# Patient Record
Sex: MALE | Race: WHITE | NOT HISPANIC OR LATINO | Employment: FULL TIME | URBAN - METROPOLITAN AREA
[De-identification: names, ages, dates, MRNs, and addresses within clinical notes are randomized per-mention and may not be internally consistent; named-entity substitution may affect disease eponyms.]

---

## 2024-11-22 ENCOUNTER — OFFICE VISIT (OUTPATIENT)
Dept: GASTROENTEROLOGY | Facility: CLINIC | Age: 24
End: 2024-11-22
Payer: COMMERCIAL

## 2024-11-22 ENCOUNTER — TELEPHONE (OUTPATIENT)
Dept: GASTROENTEROLOGY | Facility: CLINIC | Age: 24
End: 2024-11-22

## 2024-11-22 VITALS
BODY MASS INDEX: 34.72 KG/M2 | DIASTOLIC BLOOD PRESSURE: 80 MMHG | HEIGHT: 71 IN | SYSTOLIC BLOOD PRESSURE: 122 MMHG | WEIGHT: 248 LBS

## 2024-11-22 DIAGNOSIS — R10.13 EPIGASTRIC ABDOMINAL PAIN: Primary | ICD-10-CM

## 2024-11-22 DIAGNOSIS — R11.2 NAUSEA AND VOMITING, UNSPECIFIED VOMITING TYPE: ICD-10-CM

## 2024-11-22 DIAGNOSIS — R19.7 DIARRHEA, UNSPECIFIED TYPE: ICD-10-CM

## 2024-11-22 PROCEDURE — 99204 OFFICE O/P NEW MOD 45 MIN: CPT | Performed by: INTERNAL MEDICINE

## 2024-11-22 RX ORDER — OMEPRAZOLE 40 MG/1
40 CAPSULE, DELAYED RELEASE ORAL 2 TIMES DAILY
COMMUNITY

## 2024-11-22 RX ORDER — SODIUM CHLORIDE, SODIUM LACTATE, POTASSIUM CHLORIDE, CALCIUM CHLORIDE 600; 310; 30; 20 MG/100ML; MG/100ML; MG/100ML; MG/100ML
125 INJECTION, SOLUTION INTRAVENOUS CONTINUOUS
OUTPATIENT
Start: 2024-11-22

## 2024-11-22 RX ORDER — SODIUM, POTASSIUM,MAG SULFATES 17.5-3.13G
177 SOLUTION, RECONSTITUTED, ORAL ORAL ONCE
Qty: 177 ML | Refills: 0 | Status: SHIPPED | OUTPATIENT
Start: 2024-11-22 | End: 2024-11-22

## 2024-11-22 NOTE — H&P (VIEW-ONLY)
[facility] - Outpatient Consultation  Cholo Almodovar 24 y.o. male MRN: [id number]  Encounter: [id number]    ASSESSMENT AND PLAN:      Assessment & Plan  1. Abdominal pain.  The patient's symptoms could be indicative of reflux, an ulcer, or gallbladder issues, despite a negative ultrasound. The change in bowel movements could suggest inflammatory bowel disease, Crohn's disease, polyp/growth, or colitis. A HIDA scan was recommended, along with blood and stool tests to further investigate the cause of the bowel changes. An endoscopy and colonoscopy were also suggested. He was advised to continue with the omeprazole prescribed by his primary care physician. A prescription for Suprep was provided, along with instructions for its use.    Follow-up  Patient is scheduled for a follow-up visit on 12/05/2024.  1. Epigastric abdominal pain  -     NM hepatobiliary w rx; Future; Expected date: 11/22/2024  -     Calprotectin,Fecal; Future  -     Giardia antigen; Future  -     Stool Enteric Bacterial Panel by PCR; Future  -     TSH, 3rd generation; Future  -     C-reactive protein; Future  -     Celiac Disease Comprehensive Panel; Future  -     Calprotectin,Fecal  -     Giardia antigen  -     Stool Enteric Bacterial Panel by PCR  -     TSH, 3rd generation  -     C-reactive protein  -     Celiac Disease Comprehensive Panel  -     EGD; Future; Expected date: 11/22/2024  2. Nausea and vomiting, unspecified vomiting type  -     NM hepatobiliary w rx; Future; Expected date: 11/22/2024  -     Calprotectin,Fecal; Future  -     Giardia antigen; Future  -     Stool Enteric Bacterial Panel by PCR; Future  -     TSH, 3rd generation; Future  -     C-reactive protein; Future  -     Celiac Disease Comprehensive Panel; Future  -     Calprotectin,Fecal  -     Giardia antigen  -     Stool Enteric Bacterial Panel by PCR  -     TSH, 3rd generation  -     C-reactive protein  -     Celiac Disease Comprehensive  Panel  -     EGD; Future; Expected date: 11/22/2024  3. Diarrhea, unspecified type  -     NM hepatobiliary w rx; Future; Expected date: 11/22/2024  -     Calprotectin,Fecal; Future  -     Giardia antigen; Future  -     Stool Enteric Bacterial Panel by PCR; Future  -     TSH, 3rd generation; Future  -     C-reactive protein; Future  -     Celiac Disease Comprehensive Panel; Future  -     Calprotectin,Fecal  -     Giardia antigen  -     Stool Enteric Bacterial Panel by PCR  -     TSH, 3rd generation  -     C-reactive protein  -     Celiac Disease Comprehensive Panel  -     Na Sulfate-K Sulfate-Mg Sulf (Suprep Bowel Prep Kit) 17.5-3.13-1.6 GM/177ML SOLN; Take 177 mL by mouth once for 1 dose  -     Colonoscopy; Future; Expected date: 11/22/2024     Other orders    lactated ringers infusion      ---    Chief Complaint   Patient presents with    ER follow up-abd./Chest pain       HPI:   Cholo Almodovar is a 24 y.o. male presenting to [facility] care.   History of Present Illness  The patient is a 24-year-old male presenting for a consultation from Dr. Mills regarding abdominal pain.    He reports frequent bowel movements, often 2 to 3 times after meals, totaling 5 times a day. He experiences discomfort post meals and has had 10 to 12 episodes of severe upper abdominal pain over the past year, which sometimes radiates to his chest. These episodes have been so severe that he has had to lie on the floor. During his last episode, he began vomiting and was taken to the ER by his fiancé. He has tried various medications, including loperamide and over-the-counter remedies like Tums, but found no relief. His pain is sharp and stabbing, lasting for an hour before subsiding. He has also tried Gas-X and was prescribed omeprazole, which seems to help. He has been following a BRAT diet and avoiding greasy and oily foods. He has loose bowel movements 3 to 4 times a day, but denies any presence of blood in his stool or vomit. He has not  "undergone an endoscopy or colonoscopy before. A CT scan was performed, but he was informed that a HIDA scan would be more beneficial.    FAMILY HISTORY  His grandfather had cancer in his jaw and grandmother had cancer in her abdomen.    Historical Information   Past Medical History:   Diagnosis Date    Fatty liver 11/9/2024    GERD (gastroesophageal reflux disease)     Irritable bowel syndrome     Lactose intolerance      History reviewed. No pertinent surgical history.  Social History     Substance and Sexual Activity   Alcohol Use Not Currently     Social History     Substance and Sexual Activity   Drug Use Never     Social History     Tobacco Use   Smoking Status Never   Smokeless Tobacco Never     Family History   Problem Relation Age of Onset    Asthma Father     Cancer Paternal Grandfather     Cancer Paternal Grandmother        Meds/Allergies     Current Outpatient Medications:     Na Sulfate-K Sulfate-Mg Sulf (Suprep Bowel Prep Kit) 17.5-3.13-1.6 GM/177ML SOLN    omeprazole (PriLOSEC) 40 MG capsule  No Known Allergies    PHYSICAL EXAM:    Blood pressure 122/80, height 5' 11\" (1.803 m), weight 112 kg (248 lb). Body mass index is 34.59 kg/m².  Physical Exam      General Appearance: No apparent distress, cooperative, alert.  Eyes: Anicteric.  Gastrointestinal: Soft, non-tender, non-distended; normal bowel sounds; no masses, no organomegaly.    Rectal: Deferred.  Musculoskeletal: No edema.  Skin: No jaundice.     OTHER LAB RESULTS:   No results found for: \"WBC\", \"HGB\", \"MCV\", \"PLT\", \"INR\"  No results found for: \"NA\", \"K\", \"CL\", \"CO2\", \"ANIONGAP\", \"BUN\", \"CREATININE\", \"GLUCOSE\", \"GLUF\", \"CALCIUM\", \"CORRECTEDCA\", \"AST\", \"ALT\", \"ALKPHOS\", \"ALB\", \"TBILI\", \"EGFR\"  No results found for: \"IRON\", \"TIBC\", \"FERRITIN\"  No results found for: \"LIPASE\"    OTHER RADIOLOGY RESULTS:   No results found.    I have spent 41 minutes today on the date of visit which was spent on one or more of the following: obtaining and reviewing " separately obtained history, performing a medically appropriate examination and evaluation, counseling and educating the patient, ordering medications, tests, and procedures, documenting clinical information in the electronic or other health record, and care coordination.

## 2024-11-22 NOTE — TELEPHONE ENCOUNTER
Scheduled date of combo (as of today): 12/16/24  Physician performing combo: WILBERT  Location of combo: [facility]   Bowel prep reviewed with patient: Clenpiq  Instructions reviewed with patient by: TIFFANY  Clearances: N

## 2024-11-22 NOTE — PROGRESS NOTES
[facility] - Outpatient Consultation  Cholo Almodovar 24 y.o. male MRN: [id number]  Encounter: [id number]    ASSESSMENT AND PLAN:      Assessment & Plan  1. Abdominal pain.  The patient's symptoms could be indicative of reflux, an ulcer, or gallbladder issues, despite a negative ultrasound. The change in bowel movements could suggest inflammatory bowel disease, Crohn's disease, polyp/growth, or colitis. A HIDA scan was recommended, along with blood and stool tests to further investigate the cause of the bowel changes. An endoscopy and colonoscopy were also suggested. He was advised to continue with the omeprazole prescribed by his primary care physician. A prescription for Suprep was provided, along with instructions for its use.    Follow-up  Patient is scheduled for a follow-up visit on 12/05/2024.  1. Epigastric abdominal pain  -     NM hepatobiliary w rx; Future; Expected date: 11/22/2024  -     Calprotectin,Fecal; Future  -     Giardia antigen; Future  -     Stool Enteric Bacterial Panel by PCR; Future  -     TSH, 3rd generation; Future  -     C-reactive protein; Future  -     Celiac Disease Comprehensive Panel; Future  -     Calprotectin,Fecal  -     Giardia antigen  -     Stool Enteric Bacterial Panel by PCR  -     TSH, 3rd generation  -     C-reactive protein  -     Celiac Disease Comprehensive Panel  -     EGD; Future; Expected date: 11/22/2024  2. Nausea and vomiting, unspecified vomiting type  -     NM hepatobiliary w rx; Future; Expected date: 11/22/2024  -     Calprotectin,Fecal; Future  -     Giardia antigen; Future  -     Stool Enteric Bacterial Panel by PCR; Future  -     TSH, 3rd generation; Future  -     C-reactive protein; Future  -     Celiac Disease Comprehensive Panel; Future  -     Calprotectin,Fecal  -     Giardia antigen  -     Stool Enteric Bacterial Panel by PCR  -     TSH, 3rd generation  -     C-reactive protein  -     Celiac Disease Comprehensive  Panel  -     EGD; Future; Expected date: 11/22/2024  3. Diarrhea, unspecified type  -     NM hepatobiliary w rx; Future; Expected date: 11/22/2024  -     Calprotectin,Fecal; Future  -     Giardia antigen; Future  -     Stool Enteric Bacterial Panel by PCR; Future  -     TSH, 3rd generation; Future  -     C-reactive protein; Future  -     Celiac Disease Comprehensive Panel; Future  -     Calprotectin,Fecal  -     Giardia antigen  -     Stool Enteric Bacterial Panel by PCR  -     TSH, 3rd generation  -     C-reactive protein  -     Celiac Disease Comprehensive Panel  -     Na Sulfate-K Sulfate-Mg Sulf (Suprep Bowel Prep Kit) 17.5-3.13-1.6 GM/177ML SOLN; Take 177 mL by mouth once for 1 dose  -     Colonoscopy; Future; Expected date: 11/22/2024     Other orders    lactated ringers infusion      ---    Chief Complaint   Patient presents with    ER follow up-abd./Chest pain       HPI:   Cholo Almodovar is a 24 y.o. male presenting to [facility] care.   History of Present Illness  The patient is a 24-year-old male presenting for a consultation from Dr. Mills regarding abdominal pain.    He reports frequent bowel movements, often 2 to 3 times after meals, totaling 5 times a day. He experiences discomfort post meals and has had 10 to 12 episodes of severe upper abdominal pain over the past year, which sometimes radiates to his chest. These episodes have been so severe that he has had to lie on the floor. During his last episode, he began vomiting and was taken to the ER by his fiancé. He has tried various medications, including loperamide and over-the-counter remedies like Tums, but found no relief. His pain is sharp and stabbing, lasting for an hour before subsiding. He has also tried Gas-X and was prescribed omeprazole, which seems to help. He has been following a BRAT diet and avoiding greasy and oily foods. He has loose bowel movements 3 to 4 times a day, but denies any presence of blood in his stool or vomit. He has not  "undergone an endoscopy or colonoscopy before. A CT scan was performed, but he was informed that a HIDA scan would be more beneficial.    FAMILY HISTORY  His grandfather had cancer in his jaw and grandmother had cancer in her abdomen.    Historical Information   Past Medical History:   Diagnosis Date    Fatty liver 11/9/2024    GERD (gastroesophageal reflux disease)     Irritable bowel syndrome     Lactose intolerance      History reviewed. No pertinent surgical history.  Social History     Substance and Sexual Activity   Alcohol Use Not Currently     Social History     Substance and Sexual Activity   Drug Use Never     Social History     Tobacco Use   Smoking Status Never   Smokeless Tobacco Never     Family History   Problem Relation Age of Onset    Asthma Father     Cancer Paternal Grandfather     Cancer Paternal Grandmother        Meds/Allergies     Current Outpatient Medications:     Na Sulfate-K Sulfate-Mg Sulf (Suprep Bowel Prep Kit) 17.5-3.13-1.6 GM/177ML SOLN    omeprazole (PriLOSEC) 40 MG capsule  No Known Allergies    PHYSICAL EXAM:    Blood pressure 122/80, height 5' 11\" (1.803 m), weight 112 kg (248 lb). Body mass index is 34.59 kg/m².  Physical Exam      General Appearance: No apparent distress, cooperative, alert.  Eyes: Anicteric.  Gastrointestinal: Soft, non-tender, non-distended; normal bowel sounds; no masses, no organomegaly.    Rectal: Deferred.  Musculoskeletal: No edema.  Skin: No jaundice.     OTHER LAB RESULTS:   No results found for: \"WBC\", \"HGB\", \"MCV\", \"PLT\", \"INR\"  No results found for: \"NA\", \"K\", \"CL\", \"CO2\", \"ANIONGAP\", \"BUN\", \"CREATININE\", \"GLUCOSE\", \"GLUF\", \"CALCIUM\", \"CORRECTEDCA\", \"AST\", \"ALT\", \"ALKPHOS\", \"ALB\", \"TBILI\", \"EGFR\"  No results found for: \"IRON\", \"TIBC\", \"FERRITIN\"  No results found for: \"LIPASE\"    OTHER RADIOLOGY RESULTS:   No results found.    I have spent 41 minutes today on the date of visit which was spent on one or more of the following: obtaining and reviewing " separately obtained history, performing a medically appropriate examination and evaluation, counseling and educating the patient, ordering medications, tests, and procedures, documenting clinical information in the electronic or other health record, and care coordination.

## 2024-12-02 ENCOUNTER — ANESTHESIA (OUTPATIENT)
Dept: ANESTHESIOLOGY | Facility: AMBULATORY SURGERY CENTER | Age: 24
End: 2024-12-02

## 2024-12-02 ENCOUNTER — ANESTHESIA EVENT (OUTPATIENT)
Dept: ANESTHESIOLOGY | Facility: AMBULATORY SURGERY CENTER | Age: 24
End: 2024-12-02

## 2024-12-03 ENCOUNTER — RESULTS FOLLOW-UP (OUTPATIENT)
Dept: GASTROENTEROLOGY | Facility: CLINIC | Age: 24
End: 2024-12-03

## 2024-12-03 ENCOUNTER — HOSPITAL ENCOUNTER (OUTPATIENT)
Dept: RADIOLOGY | Facility: HOSPITAL | Age: 24
Discharge: HOME | End: 2024-12-03
Attending: INTERNAL MEDICINE
Payer: COMMERCIAL

## 2024-12-03 DIAGNOSIS — R10.13 EPIGASTRIC ABDOMINAL PAIN: ICD-10-CM

## 2024-12-03 DIAGNOSIS — R11.2 NAUSEA AND VOMITING, UNSPECIFIED VOMITING TYPE: ICD-10-CM

## 2024-12-03 DIAGNOSIS — R19.7 DIARRHEA, UNSPECIFIED TYPE: ICD-10-CM

## 2024-12-03 LAB
CRP SERPL-MCNC: 3.2 MG/L
TSH SERPL-ACNC: 1.02 MIU/L (ref 0.4–4.5)

## 2024-12-03 PROCEDURE — A9537 TC99M MEBROFENIN: HCPCS

## 2024-12-03 PROCEDURE — 78227 HEPATOBIL SYST IMAGE W/DRUG: CPT

## 2024-12-03 RX ORDER — WATER 10 ML/10ML
INJECTION INTRAMUSCULAR; INTRAVENOUS; SUBCUTANEOUS
Status: COMPLETED
Start: 2024-12-03 | End: 2024-12-03

## 2024-12-03 RX ORDER — SINCALIDE 5 UG/5ML
INJECTION, POWDER, LYOPHILIZED, FOR SOLUTION INTRAVENOUS
Status: COMPLETED
Start: 2024-12-03 | End: 2024-12-03

## 2024-12-03 RX ORDER — SINCALIDE 5 UG/5ML
0.02 INJECTION, POWDER, LYOPHILIZED, FOR SOLUTION INTRAVENOUS
Status: DISCONTINUED | OUTPATIENT
Start: 2024-12-03 | End: 2024-12-04 | Stop reason: HOSPADM

## 2024-12-03 RX ADMIN — SINCALIDE 2.2 MCG: 5 INJECTION, POWDER, LYOPHILIZED, FOR SOLUTION INTRAVENOUS at 09:20

## 2024-12-03 RX ADMIN — WATER 5 ML: 1 INJECTION INTRAMUSCULAR; INTRAVENOUS; SUBCUTANEOUS at 09:20

## 2024-12-08 LAB
CALPROTECTIN STL-MCNT: 65 MCG/G
IGA SERPL-MCNC: 172 MG/DL (ref 47–310)
TTG IGA SER-ACNC: <1 U/ML

## 2024-12-16 ENCOUNTER — HOSPITAL ENCOUNTER (OUTPATIENT)
Dept: GASTROENTEROLOGY | Facility: AMBULATORY SURGERY CENTER | Age: 24
Discharge: HOME | End: 2024-12-16
Attending: INTERNAL MEDICINE
Payer: COMMERCIAL

## 2024-12-16 ENCOUNTER — ANESTHESIA EVENT (OUTPATIENT)
Dept: GASTROENTEROLOGY | Facility: AMBULATORY SURGERY CENTER | Age: 24
End: 2024-12-16

## 2024-12-16 ENCOUNTER — ANESTHESIA (OUTPATIENT)
Dept: GASTROENTEROLOGY | Facility: AMBULATORY SURGERY CENTER | Age: 24
End: 2024-12-16

## 2024-12-16 VITALS
SYSTOLIC BLOOD PRESSURE: 100 MMHG | BODY MASS INDEX: 34.02 KG/M2 | HEIGHT: 71 IN | OXYGEN SATURATION: 97 % | WEIGHT: 243 LBS | DIASTOLIC BLOOD PRESSURE: 59 MMHG | TEMPERATURE: 98 F | HEART RATE: 65 BPM | RESPIRATION RATE: 13 BRPM

## 2024-12-16 DIAGNOSIS — R11.2 NAUSEA AND VOMITING, UNSPECIFIED VOMITING TYPE: ICD-10-CM

## 2024-12-16 DIAGNOSIS — R10.13 EPIGASTRIC ABDOMINAL PAIN: ICD-10-CM

## 2024-12-16 DIAGNOSIS — R19.7 DIARRHEA, UNSPECIFIED TYPE: ICD-10-CM

## 2024-12-16 PROCEDURE — 88305 TISSUE EXAM BY PATHOLOGIST: CPT | Performed by: PATHOLOGY

## 2024-12-16 PROCEDURE — 45380 COLONOSCOPY AND BIOPSY: CPT | Performed by: INTERNAL MEDICINE

## 2024-12-16 PROCEDURE — 43239 EGD BIOPSY SINGLE/MULTIPLE: CPT | Performed by: INTERNAL MEDICINE

## 2024-12-16 RX ORDER — LIDOCAINE HYDROCHLORIDE 20 MG/ML
INJECTION, SOLUTION EPIDURAL; INFILTRATION; INTRACAUDAL; PERINEURAL AS NEEDED
Status: DISCONTINUED | OUTPATIENT
Start: 2024-12-16 | End: 2024-12-16

## 2024-12-16 RX ORDER — SODIUM CHLORIDE, SODIUM LACTATE, POTASSIUM CHLORIDE, CALCIUM CHLORIDE 600; 310; 30; 20 MG/100ML; MG/100ML; MG/100ML; MG/100ML
50 INJECTION, SOLUTION INTRAVENOUS CONTINUOUS
Status: DISCONTINUED | OUTPATIENT
Start: 2024-12-16 | End: 2024-12-20 | Stop reason: HOSPADM

## 2024-12-16 RX ORDER — PROPOFOL 10 MG/ML
INJECTION, EMULSION INTRAVENOUS AS NEEDED
Status: DISCONTINUED | OUTPATIENT
Start: 2024-12-16 | End: 2024-12-16

## 2024-12-16 RX ORDER — SODIUM CHLORIDE, SODIUM LACTATE, POTASSIUM CHLORIDE, CALCIUM CHLORIDE 600; 310; 30; 20 MG/100ML; MG/100ML; MG/100ML; MG/100ML
125 INJECTION, SOLUTION INTRAVENOUS CONTINUOUS
Status: DISCONTINUED | OUTPATIENT
Start: 2024-12-16 | End: 2024-12-20 | Stop reason: HOSPADM

## 2024-12-16 RX ORDER — SODIUM CHLORIDE, SODIUM LACTATE, POTASSIUM CHLORIDE, CALCIUM CHLORIDE 600; 310; 30; 20 MG/100ML; MG/100ML; MG/100ML; MG/100ML
INJECTION, SOLUTION INTRAVENOUS CONTINUOUS PRN
Status: DISCONTINUED | OUTPATIENT
Start: 2024-12-16 | End: 2024-12-16

## 2024-12-16 RX ORDER — OMEPRAZOLE 40 MG/1
40 CAPSULE, DELAYED RELEASE ORAL DAILY
Qty: 30 CAPSULE | Refills: 2 | Status: SHIPPED | OUTPATIENT
Start: 2024-12-16 | End: 2025-03-16

## 2024-12-16 RX ADMIN — PROPOFOL 300 MG: 10 INJECTION, EMULSION INTRAVENOUS at 13:30

## 2024-12-16 RX ADMIN — PROPOFOL 50 MG: 10 INJECTION, EMULSION INTRAVENOUS at 13:38

## 2024-12-16 RX ADMIN — PROPOFOL 50 MG: 10 INJECTION, EMULSION INTRAVENOUS at 13:35

## 2024-12-16 RX ADMIN — PROPOFOL 50 MG: 10 INJECTION, EMULSION INTRAVENOUS at 13:43

## 2024-12-16 RX ADMIN — LIDOCAINE HYDROCHLORIDE 100 MG: 20 INJECTION, SOLUTION EPIDURAL; INFILTRATION; INTRACAUDAL; PERINEURAL at 13:30

## 2024-12-16 RX ADMIN — SODIUM CHLORIDE, SODIUM LACTATE, POTASSIUM CHLORIDE, CALCIUM CHLORIDE: 600; 310; 30; 20 INJECTION, SOLUTION INTRAVENOUS at 13:14

## 2024-12-16 RX ADMIN — SODIUM CHLORIDE, SODIUM LACTATE, POTASSIUM CHLORIDE, CALCIUM CHLORIDE 50 ML/HR: 600; 310; 30; 20 INJECTION, SOLUTION INTRAVENOUS at 13:22

## 2024-12-16 NOTE — INTERVAL H&P NOTE
H&P reviewed. After examining the patient I find no changes in the patients condition since the H&P had been written.    Vitals:    12/16/24 1314   BP: 147/90   Pulse: 55   Resp: 18   Temp: 98 °F (36.7 °C)   SpO2: 99%

## 2024-12-16 NOTE — ANESTHESIA PREPROCEDURE EVALUATION
Procedure:  COLONOSCOPY  EGD    Relevant Problems   ANESTHESIA (within normal limits)        Physical Exam    Airway    Mallampati score: I  TM Distance: >3 FB  Neck ROM: full     Dental   No notable dental hx     Cardiovascular  Cardiovascular exam normal    Pulmonary  Pulmonary exam normal     Other Findings        Anesthesia Plan  ASA Score- 1     Anesthesia Type- IV sedation with anesthesia with ASA Monitors.         Additional Monitors:     Airway Plan:     Comment: I discussed risks (reviewed with patient on the anesthesia consent form), benefits and alternatives of monitored sedation including the possibility under sedation to have recall or mild discomfort.  .       Plan Factors-    Chart reviewed.    Patient summary reviewed.                  Induction- intravenous.    Postoperative Plan-         Informed Consent- Anesthetic plan and risks discussed with patient.  I personally reviewed this patient with the CRNA. Discussed and agreed on the Anesthesia Plan with the CRNA..

## 2024-12-16 NOTE — ANESTHESIA POSTPROCEDURE EVALUATION
Post-Op Assessment Note    CV Status:  Stable  Pain Score: 0    Pain management: adequate       Mental Status:  Arousable and sleepy   Hydration Status:  Stable   PONV Controlled:  Controlled   Airway Patency:  Patent     Post Op Vitals Reviewed: Yes    No anethesia notable event occurred.    Staff: CRNA           Last Filed PACU Vitals:  Vitals Value Taken Time   Temp     Pulse 64    /55    Resp 14    SpO2 99        Modified Riley:  Activity: 2 (12/16/2024  1:15 PM)  Respiration: 2 (12/16/2024  1:15 PM)  Circulation: 2 (12/16/2024  1:15 PM)  Consciousness: 2 (12/16/2024  1:15 PM)  Oxygen Saturation: 2 (12/16/2024  1:15 PM)  Modified Riley Score: 10 (12/16/2024  1:15 PM)

## 2024-12-19 ENCOUNTER — RESULTS FOLLOW-UP (OUTPATIENT)
Dept: GASTROENTEROLOGY | Facility: CLINIC | Age: 24
End: 2024-12-19